# Patient Record
Sex: MALE | Race: WHITE
[De-identification: names, ages, dates, MRNs, and addresses within clinical notes are randomized per-mention and may not be internally consistent; named-entity substitution may affect disease eponyms.]

---

## 2017-02-23 ENCOUNTER — HOSPITAL ENCOUNTER (OUTPATIENT)
Dept: HOSPITAL 84 - D.CATH | Age: 77
Discharge: HOME | End: 2017-02-23
Attending: INTERNAL MEDICINE
Payer: MEDICARE

## 2017-02-23 VITALS — WEIGHT: 180.38 LBS | BODY MASS INDEX: 23.91 KG/M2 | BODY MASS INDEX: 23.91 KG/M2 | HEIGHT: 73 IN

## 2017-02-23 VITALS — DIASTOLIC BLOOD PRESSURE: 70 MMHG | SYSTOLIC BLOOD PRESSURE: 159 MMHG

## 2017-02-23 DIAGNOSIS — I48.92: Primary | ICD-10-CM

## 2017-02-23 LAB
ANION GAP SERPL CALC-SCNC: 11.3 MMOL/L (ref 8–16)
BASOPHILS NFR BLD AUTO: 0.6 % (ref 0–2)
BUN SERPL-MCNC: 19 MG/DL (ref 7–18)
CALCIUM SERPL-MCNC: 8.7 MG/DL (ref 8.5–10.1)
CHLORIDE SERPL-SCNC: 104 MMOL/L (ref 98–107)
CO2 SERPL-SCNC: 28.1 MMOL/L (ref 21–32)
CREAT SERPL-MCNC: 1.2 MG/DL (ref 0.6–1.3)
EOSINOPHIL NFR BLD: 1.9 % (ref 0–7)
ERYTHROCYTE [DISTWIDTH] IN BLOOD BY AUTOMATED COUNT: 14.1 % (ref 11.5–14.5)
GLUCOSE SERPL-MCNC: 98 MG/DL (ref 74–106)
HCT VFR BLD CALC: 41.5 % (ref 42–54)
HGB BLD-MCNC: 14 G/DL (ref 13.5–17.5)
IMM GRANULOCYTES NFR BLD: 0.3 % (ref 0–5)
INR PPP: 1.67 (ref 0.85–1.17)
LYMPHOCYTES NFR BLD AUTO: 32.9 % (ref 15–50)
MCH RBC QN AUTO: 28.2 PG (ref 26–34)
MCHC RBC AUTO-ENTMCNC: 33.7 G/DL (ref 31–37)
MCV RBC: 83.5 FL (ref 80–100)
MONOCYTES NFR BLD: 8.8 % (ref 2–11)
NEUTROPHILS NFR BLD AUTO: 55.5 % (ref 40–80)
OSMOLALITY SERPL CALC.SUM OF ELEC: 279 MOSM/KG (ref 275–300)
PLATELET # BLD: 209 10X3/UL (ref 130–400)
PMV BLD AUTO: 9.9 FL (ref 7.4–10.4)
POTASSIUM SERPL-SCNC: 4.4 MMOL/L (ref 3.5–5.1)
PROTHROMBIN TIME: 19.6 SECONDS (ref 11.6–15)
RBC # BLD AUTO: 4.97 10X6/UL (ref 4.2–6.1)
SODIUM SERPL-SCNC: 139 MMOL/L (ref 136–145)
WBC # BLD AUTO: 7.3 10X3/UL (ref 4.8–10.8)

## 2017-02-23 NOTE — NUR
RESTING IN BED, SPEAKING WITH FAMILY AT BEDSIDE. CARDIAC MONITOR
SHOWS SINUS ALEJANDRA AT RATE OF 51. 2L NC, NO RESP DISTRESS, NO C/O
PAIN. WILL CONTINUE TO MONITOR.

## 2017-02-23 NOTE — HEMODYNAMI
PATIENT:JOE SNOW                                    MEDICAL RECORD: [id number]
: 38                                            LOCATION:D.CAT          
ACCT# [id number]                                       ADMISSION DATE: 17
 
 
 Generatedon:20179:33
Patient name: JOE SNOW Patient #: [id number] Visit #: [id number] SSN: : 
1940
Date of study: 2017
Page: Of
Hemodynamic Procedure Report
****************************
Patient Data
Patient Demographics
Procedure consent was obtained
First Name: JOE          Gender: Male
Last Name: GWENDOLYN             : 1940
Middle Initial: J           Age: 77 year(s)
Patient #: [id number]       Race: Unknown
Visit #: [id number]
Accession #:
[id number]
Additional ID: [id number]
Contact details
Address: [place] [address]         Phone: [phone]
State: [place]
City: [place]
Zip code: [address]
Past Medical History
Allergies: No known allergies
Admission
Admission Data
Admission Date: 2017   Admission Time: 7:49
Procedure
Procedure Types
Cath Procedure
Diagnostic Procedure
Cardioversion
Procedure Description
Procedure Date
Procedure Date: 2017
Procedure Start Time: 9:05
Procedure Staff
Name                            Function
Dewey Quan MD                   Performing Physician
Abdirashid Velez RN                  Nurse
Angela Katz RT             Monitor
Сергей Frost RT                  Circulator
Virgilio Quintero MD                   Additional personnel
Procedure Data
Cath Procedure
Fluoroscopy
Diagnostic fluoroscopy      Total fluoroscopy Time: 0
time: 0 min                 min
Procedure Medications
Medication           Administration Route Dosage
Oxygen               NC                   2 l/min
Refer to Anesthesia
Notes for Sedation
 
Medications
Diprivan 1%          I.V.                 100 mg
(Propofol)
Hemodynamics
Rest
Heart Rate: 45 (bpm)
Snapshots
Pre Cath      Intra         NCS           Post Cath
Vital Signs
Time    Heart  Resp   SPO2 etCO2   PD2fcyb Respiration NIBP (mmHg) Rhythm  Pain 
   Sedation
Rate   (ipm)  (%)  (mmHg)  (mmHg)  (CO2) (ipm)                     Status  Level
(bpm)
9:21:39 44     20     97   17.8    0       19          Measuring   NSR     0 (11
)  10(A)
, No
pain
9:21:49 44     26     97   29.7    1.4     21          181/69(137) NSR     0 (11
)  8(A)
, No
pain
9:26:13 65     14     89   29      0                   118/70(95)  NSR     0 (11
)  9(A)
, No
pain
9:30:21 66     20     99   8.9     0       10          114/74(110) NSR     0 (11
)  10(A)
, No
pain
Medications
Time    Medication  Route  Dose  Verified Delivered Reason    Notes  Effectivene
ss
by       by
9:21:14 Diprivan 1% I.V.   100   Dewey     DR Quintero    for
(Propofol)         mg    Kadeem ALARCON           sedation
9:21:50 Oxygen      NC     2     Dewey Mendenhall    used for
l/min Kadeem Velez RN   procedure
9:21:54 Refer to                 Deweysrikanth Mendenhall
Anesthesia               Kadeem Velez RN
Notes for
Sedation
Medications
Procedure Log
Time     Note
9:08:04  Сергей Frost RT(R) sent for patient. Start room use.
9:08:05  Time tracking: Regular hours
9:08:14  Plan of Care:Hemodynamics will remain stable., Cardiac
rhythm will remain stable., Comfort level will be
maintained., Respiratory function will remain
adequate., Patient/ family verbilizes understanding of
procedure., Procedure tolerated without complication.,
Recovers from procedure without complications..
9:13:06  Patient received from Pre/Post Procedure Room to CCL 1
Alert and oriented. Tansferred to table in Supine
position.
9:13:07  Warm blankets applied, and cong hugger turned on for
patient comfort.
9:13:07  Correct patient and procedure confirmed by team.
9:13:09  Signed procedure consent form obtained from patient.
9:13:10  ECG and BP/O2 sat monitors applied to patient.
 
9:13:11  Full Disclosure recording started
9:13:58  H&P Date Dictated: 2017 Within 30 days and on
chart., H&P Addendum completed by physician on day of
procedure. (MUST COMPLETE FOR ALL OUTPATIENTS).
9:14:00  Pre-procedure instructions explained to patient.
9:14:01  Pre-op teaching completed and patient verbalized
understanding.
9:14:05  Family in waiting room.
9:14:44  Patient allergic to No known allergies
9:14:54  ----Pre-sedation anethsthesia assessment.----
9:15:09  See anestesia note for assessment
9:15:26  Patient pain scale 0/10 ?.
9:15:34  IV patent on arrival in left hand with 0.9% NaCl at
[facility].
9:15:39  Lab results completed and on chart.
9:16:22  Alarms reviewed by ASTER COREY
9:16:30  Quick combo pads placed on patients chest and back.
9:16:38  Quick Combo opened to sterile field.
9:18:57  Baseline sample Acquired.
9:19:24  Dr Quintero present and monitoring patient for TIVA.
9:19:49  Vital chart was started
9:20:02  Final Timeout: patient, procedure, and site verified
with staff and physician. All members of the team are
in agreement.
9:20:07  Physical assessment completed. ASA score P 2 - A
patient with mild systemic disease as per Dewey Quan MD.
9:20:13  Sedation plan: TIVA Propofol
9:21:14  Diprivan 1% (Propofol) 100 mg I.V. was given by DR Quintero; for sedation;
9:21:50  Oxygen 2 l/min NC was given by Abdirashid Velez RN; used
for procedure;
9:21:54  Refer to Anesthesia Notes for Sedation Medications was
given by Abdirashid Velez RN; ;
9:23:13  Defibrillator synced and charged to 100 Joules.
9:23:17  Shock delivered.
9:23:28  Patient cardioverted to 1st degree heart block.
9:24:50  Procedure ended.(Physican Out)
9:30:03  Fluoroscopy time 00.00 minutes.
9:30:23  Post procedure rhythm: sinus bradycardia
9:30:46  Post procedure instruction explained to
patient.Patient verbalizes understanding.
9:30:47  Patient needs reinforcement of post procedure
teaching.
9:30:54  Procedure and supply charges have been captured,
reviewed, submitted and are correct.
9:31:12  See physician's report for complete and final results.
9:31:16  Report given to Pre/Post Procedure Room.
9:33:00  Vital chart was stopped
9:33:07  Patient transfered to Pre/Post Procedure Room with
Stretcher.
9:33:18  End room use (Document Last)
Device Usage
Item  Manufacture  Quantity  Catalog       Hospital Part    Current Minimal Lot#
 /
Name                         Number        Charge   Number  Stock   Stock   Rashawn ruff#
Code
Global Locate 1         [id number]  [id number]   [id number]  [id number]  5
Combo
 
Signature Audit Belk
Stage           Time        Signature      Unsigned
Intra-Procedure 2017   Angela
9:33:29 AM  Counts RT(R)
Signatures
Monitor : Angela     Signature :
Counts RT               ______________________________
Date : ______________ Time :
______________
 
 
 
 
 
 
 
 
 
 
 
 
 
 
 
 
 
 
 
 
 
 
 
 
 
 
 
 
 
 
 
 
 
 
 
 
 
 
 
 
 
 
 
 
 
 
[address] [place] [address]

## 2017-03-21 NOTE — OP
PATIENT NAME:  JOE ALFARO                             MEDICAL RECORD: [id number]
:[birth year]                                             LOCATION:D.CAT          
                                                         ADMISSION DATE:        
SURGEON:  LES BARON M.D.          
 
 
DATE OF OPERATION:  2017
 
Cardioversion Note
 
REFERRING PHYSICIAN:  Torito Alfaro MD
 
PROCEDURE PERFORMED:  Cardioversion.
 
INDICATION:  A 77-year-old gentleman, presents with persistent atrial flutter.
 
DESCRIPTION OF PROCEDURE:  The patient was brought back to the cath lab.  It was
confirmed he remained in atrial flutter with variable block.  He received
anesthesia in the form of propofol.  Once the patient was adequately sedated, he
received 1 discharge of 100 joules.  This resulted in restoration of sinus
rhythm.  He tolerated the procedure well without any apparent complication.
 
IMPRESSION:  Successful cardioversion with restoration of sinus rhythm.
 
TRANSINT:[id number] Voice Confirmation ID: [id number] DOCUMENT ID: [id number]
                                           
                                           LES BARON M.D.          
 
 
 
Electronically Signed by LES BARON on 17 at 0817
 
 
 
 
 
 
 
 
 
 
 
 
 
 
 
 
 
 
 
CC:                                                             [id number]
DICTATION DATE: 17     :     17 1012      [facility] CLI 
                                                                      17
[id number] [place] [address]

## 2019-04-15 NOTE — NUR
Jose Stahl is a 63 year old male presenting for   Chief Complaint   Patient presents with   • Cough     Along with head and chest congestion, aches. x 2 days. Used Cough syrup     Denies Latex allergy or sensitivity.    Medication verified and med list updated  Refills needed today: No    Health Maintenance Due   Topic Date Due   • Depression Screening  11/25/1966   • Pneumococcal 19-64 Medium Risk (1 of 1 - PPSV23) 11/25/1973   • Shingles Vaccine (1 of 2) 11/25/2004   • Hepatitis C Screening  11/25/2005   • Influenza Vaccine (1) 09/01/2018       Patient is due for the topics as listed above                      RESTING IN BED, FAMILY AT BEDSIDE. VSS, NO RESP DISTRESS NOTED. NO
C/O NAUSEA OR CHEST PAIN. WILL CONTINUE TO MONITOR.

## 2020-04-01 ENCOUNTER — HOSPITAL ENCOUNTER (OUTPATIENT)
Dept: HOSPITAL 84 - D.CATH | Age: 80
Setting detail: OBSERVATION
LOS: 1 days | Discharge: HOME | End: 2020-04-02
Attending: INTERNAL MEDICINE | Admitting: INTERNAL MEDICINE
Payer: MEDICARE

## 2020-04-01 VITALS — DIASTOLIC BLOOD PRESSURE: 63 MMHG | SYSTOLIC BLOOD PRESSURE: 134 MMHG

## 2020-04-01 VITALS — SYSTOLIC BLOOD PRESSURE: 164 MMHG | DIASTOLIC BLOOD PRESSURE: 62 MMHG

## 2020-04-01 VITALS — DIASTOLIC BLOOD PRESSURE: 47 MMHG | SYSTOLIC BLOOD PRESSURE: 120 MMHG

## 2020-04-01 VITALS — SYSTOLIC BLOOD PRESSURE: 157 MMHG | DIASTOLIC BLOOD PRESSURE: 52 MMHG

## 2020-04-01 VITALS — BODY MASS INDEX: 24.15 KG/M2 | WEIGHT: 182.18 LBS | BODY MASS INDEX: 24.15 KG/M2 | HEIGHT: 73 IN

## 2020-04-01 VITALS — DIASTOLIC BLOOD PRESSURE: 59 MMHG | SYSTOLIC BLOOD PRESSURE: 120 MMHG

## 2020-04-01 VITALS — DIASTOLIC BLOOD PRESSURE: 44 MMHG | SYSTOLIC BLOOD PRESSURE: 124 MMHG

## 2020-04-01 VITALS — DIASTOLIC BLOOD PRESSURE: 58 MMHG | SYSTOLIC BLOOD PRESSURE: 122 MMHG

## 2020-04-01 VITALS — DIASTOLIC BLOOD PRESSURE: 56 MMHG | SYSTOLIC BLOOD PRESSURE: 130 MMHG

## 2020-04-01 VITALS — SYSTOLIC BLOOD PRESSURE: 128 MMHG | DIASTOLIC BLOOD PRESSURE: 64 MMHG

## 2020-04-01 VITALS — DIASTOLIC BLOOD PRESSURE: 48 MMHG | SYSTOLIC BLOOD PRESSURE: 111 MMHG

## 2020-04-01 VITALS — DIASTOLIC BLOOD PRESSURE: 63 MMHG | SYSTOLIC BLOOD PRESSURE: 128 MMHG

## 2020-04-01 DIAGNOSIS — I49.5: Primary | ICD-10-CM

## 2020-04-01 LAB
ANION GAP SERPL CALC-SCNC: 10.6 MMOL/L (ref 8–16)
ANION GAP SERPL CALC-SCNC: 12.8 MMOL/L (ref 8–16)
APTT BLD: 37.1 SECONDS (ref 22.8–39.4)
APTT BLD: 38.3 SECONDS (ref 22.8–39.4)
BUN SERPL-MCNC: 24 MG/DL (ref 7–18)
BUN SERPL-MCNC: 27 MG/DL (ref 7–18)
CALCIUM SERPL-MCNC: 8.1 MG/DL (ref 8.5–10.1)
CALCIUM SERPL-MCNC: 8.7 MG/DL (ref 8.5–10.1)
CHLORIDE SERPL-SCNC: 102 MMOL/L (ref 98–107)
CHLORIDE SERPL-SCNC: 104 MMOL/L (ref 98–107)
CO2 SERPL-SCNC: 25.5 MMOL/L (ref 21–32)
CO2 SERPL-SCNC: 26.9 MMOL/L (ref 21–32)
CREAT SERPL-MCNC: 1.1 MG/DL (ref 0.6–1.3)
CREAT SERPL-MCNC: 1.2 MG/DL (ref 0.6–1.3)
ERYTHROCYTE [DISTWIDTH] IN BLOOD BY AUTOMATED COUNT: 14.3 % (ref 11.5–14.5)
GLUCOSE SERPL-MCNC: 116 MG/DL (ref 74–106)
GLUCOSE SERPL-MCNC: 98 MG/DL (ref 74–106)
HCT VFR BLD CALC: 38.1 % (ref 42–54)
HGB BLD-MCNC: 12.5 G/DL (ref 13.5–17.5)
INR PPP: 1.06 (ref 0.85–1.17)
INR PPP: 1.17 (ref 0.85–1.17)
MCH RBC QN AUTO: 27.8 PG (ref 26–34)
MCHC RBC AUTO-ENTMCNC: 32.8 G/DL (ref 31–37)
MCV RBC: 84.9 FL (ref 80–100)
OSMOLALITY SERPL CALC.SUM OF ELEC: 276 MOSM/KG (ref 275–300)
OSMOLALITY SERPL CALC.SUM OF ELEC: 278 MOSM/KG (ref 275–300)
PLATELET # BLD: 242 10X3/UL (ref 130–400)
PMV BLD AUTO: 9.9 FL (ref 7.4–10.4)
POTASSIUM SERPL-SCNC: 4.3 MMOL/L (ref 3.5–5.1)
POTASSIUM SERPL-SCNC: 4.5 MMOL/L (ref 3.5–5.1)
PROTHROMBIN TIME: 13.8 SECONDS (ref 11.6–15)
PROTHROMBIN TIME: 14.9 SECONDS (ref 11.6–15)
RBC # BLD AUTO: 4.49 10X6/UL (ref 4.2–6.1)
SODIUM SERPL-SCNC: 136 MMOL/L (ref 136–145)
SODIUM SERPL-SCNC: 137 MMOL/L (ref 136–145)
WBC # BLD AUTO: 6.7 10X3/UL (ref 4.8–10.8)

## 2020-04-01 NOTE — HEMODYNAMI
PATIENT:JOE SNOW                                    MEDICAL RECORD: [id number]
: 38                                            LOCATION:D.CAT          
ACCT# [id number]                                       ADMISSION DATE: 20
 
 
 Generatedon:202013:17
Patient name: JOE SNOW Patient #: [id number] Visit #: [id number] SSN: [id number]
[id number] :
1940 Date of study: 2020
Page: Of
Hemodynamic Procedure Report
****************************
Patient Data
Patient Demographics
Procedure consent was obtained
First Name: JOE          Gender: Male
Last Name: GWENDOLYN             : 1940
Middle Initial: J           Age: 80 year(s)
Patient #: [id number]       Race: 
Visit #: [id number]
SSN: [id number]
Accession #:
[id number]
Additional ID: [id number]
Contact details
Address: [place] [address]         Phone: [phone]
State: [place]
City: [place]
Zip code: [address]
Past Medical History
Allergies: No known allergies
Admission
Admission Data
Admission Date: 2020    Admission Time: 10:49
Arrival Date: 2020      Arrival Time: 0:00
Lab Results
Lab Result Date: 2020   Lab Result Time: 0:00
CBC
Name         Units    Result                Min      Max
Hematocrit   %        38.1     *-(----)--   42       54
Hemoglobin   g/dl     12.5     *-(----)--   13.5     17.5
Procedure
Procedure Types
Cath Procedure
Diagnostic Procedure
PPM/ICD
PPM Dual Implant
Sedation Charges
Moderate Sedation up to 30 minutes
Procedure Description
Procedure Date
Procedure Date: 2020
Procedure Start Time: 12:39
Procedure End Time: 13:14
Procedure Staff
Name                            Function
Dillan Recinos MD              Performing Physician
Alejandro Torres MD             Assisting physician
Reina Hubbard RT              Monitor
 
William Xie RN              Nurse
Shannan Hitchcock RT                Scrub
Indication
Sick Sinus Syndrome
Procedure Data
Cath Procedure
Fluoroscopy
Diagnostic fluoroscopy      Total fluoroscopy Time: 3.4
time: 3.4 min               min
Diagnostic fluoroscopy      Total fluoroscopy dose:
dose: 140.08 mGy            140.08 mGy
Estimated blood loss: 10 ml
Procedure Complications
No complications
Procedure Medications
Medication           Administration Route Dosage
0.9% NaCl            I.V.                 100 ml/hr
Oxygen               etCO2 Nasal cannula  4 l/min
Lidocaine 1%         added to field       20
Ancef (1Gm/50ml NS)  I.V.P.B              1 g
Ancef Irrigation                          1 g
(1gm/500ml NS)
Versed               I.V.                 2 mg
Fentanyl             I.V.                 100 mcg
Versed               I.V.                 1 mg
Hemodynamics
Rest
HGB: 12.5 (g/dl) Heart Rate: 44 (bpm)
Snapshots
Pre Cath      Intra         NCS           Post Cath
Vital Signs
Time     Heart  Resp   SPO2 etCO2   NIBP (mmHg) Rhythm  Pain    Sedation
Rate   (ipm)  (%)  (mmHg)                      Status  Level
(bpm)
12:28:19 46     12     99   27      151/64(111) SB      0 (11)  10(A)
, No
pain
12:32:41 44     10     99   9       131/59(90)  SB      0 (11)  10(A)
, No
pain
12:36:59 42     21     98   23.2    102/51(83)  SB      0 (11)  10(A)
, No
pain
12:41:05 177    15     96   27.7    108/52(84)  SB      0 (11)  10(A)
, No
pain
12:45:13 43     15     91   26.2    110/55(75)  SB      0 (11)  9(A)
, No
pain
12:49:21 82     15     95   21.7    79/52(74)   SB      0 (11)  9(A)
, No
pain
12:54:16 42     16     96   18.7    115/58(88)  SB      0 (11)  9(A)
, No
pain
12:58:19 95     16     96   24      121/73(91)  Paced   0 (11)  10(A)
, No
pain
13:02:27 87     16     96   19.5    120/72(86)  Paced   0 (11)  10(A)
, No
 
pain
13:06:33 84     18     96   9       131/74(99)  Paced   0 (11)  10(A)
, No
pain
13:10:43 63     23     92   13.4    134/74(112) Paced   0 (11)  10(A)
, No
pain
Medications
Time     Medication  Route   Dose  Verified Delivered Reason     Notes  Effectiv
eness
by       by
12:26:09 0.9% NaCl   I.V.    100   William  William   Per
ml/hr Rojas Xie   physician
RN       RN
12:26:23 Oxygen      etCO2   4     William  William   for low 02
Nasal   l/min Lorigan  Lorigan   sats
cannula       RN       RN
12:26:55 Lidocaine   added   20ml  William  William   for local
1%          to      vial  Lorigan  Lorigan   anesthetic
field   ( x 2 RN       RN
)
12:28:49 Ancef       I.V.P.B 1 g   William  William   Per
(1Gm/50ml                 Lorigan  Lorigan   physician
NS)                       RN       RN
12:29:50 Ancef       Topical 1 g   William  William   used for
Irrigation  ( added       Lorigan  Lorigan   procedure
(1gm/500ml  to            RN       RN
NS)         field )
12:37:08 Versed      I.V.    2 mg  William  William   for
Lorigan  Lorigan   sedation
RN       RN
12:37:22 Fentanyl    I.V.    100   William  William   for
mcg   Lorigan  Lorigan   sedation
RN       RN
12:39:20 Versed      I.V.    1 mg  William  William   for
Lorigan  Lorigan   sedation
RN       RN
Procedure Log
Time     Note
12:03:41 Informed consent obtained and on chart
12:04:25 Indication : Sick Sinus Syndrome
12:04:33 Arrival Date: 2020 12:00:00 AM
12:04:58 Procedure Status PPM/ Gen Change/ Lead Revision/ Temp.
12:05:02 William Xie RN sent for patient. Start room use.
12:05:08 Time tracking: Regular hours (M-F 7:00 - 5:00)
12:05:15 Plan of Care:Hemodynamics will remain stable., Cardiac rhythm will
remain stable., Comfort level will be maintained., Respiratory function
will remain adequate., Patient/ family verbilizes understanding of
procedure., Procedure tolerated without complication., Recovers from
procedure without complications..
12:05:40 Med[employer] representative PARAMJIT JOHN present for procedure.
12:20:07 Patient received from Pre/Post Procedure Room to CCL 3 Alert and
oriented. Tansferred to table in Supine position.
12:25:21 Warm blankets applied, and cong hugger turned on for patient comfort.
12:25:22 Correct patient and procedure confirmed by team.
12:26:09 0.9% NaCl 100 ml/hr I.V. was administered by William Xie RN; Per
physician; Verbal order read back and verified.
12:26:23 Oxygen 4 l/min etCO2 Nasal cannula was administered by William Xie
RN; for low 02 sats; Verbal order read back and verified.
12:26:55 Lidocaine 1% 20ml vial ( x 2 ) added to field was administered by
 
William Xie RN; for local anesthetic; Verbal order read back and
verified.
12:27:02 Vital chart was started
12:28:49 Ancef (1Gm/50ml NS) 1 g I.V.P.B was administered by William Xie RN;
Per physician; Verbal order read back and verified.
12:29:50 Ancef Irrigation (1gm/500ml NS) 1 g Topical ( added to field ) was
administered by William Xie RN; used for procedure; Verbal order
read back and verified.
12:32:09 ECG and BP/O2 sat monitors applied to patient.
12:32:10 Baseline sample Acquired.
12:32:26 Rhythm: sinus bradycardia
12:32:29 Full Disclosure recording started
12:32:38 H&P Date Dictated: 2020 H&P Addendum completed by physician on day
of procedure. (MUST COMPLETE FOR ALL OUTPATIENTS), New H&P dictated by
physician..
12:32:39 Pre-procedure instructions explained to patient.
12:32:40 Pre-op teaching completed and patient verbalized understanding.
12:32:49 Family unavailable.
12:32:52 Patient NPO since Midnight.
12:33:08 Patient allergic to No known allergies
12:33:13 Is the patient allergic to Iodine/contrast media? No.
12:33:17 Was the patient premedicated? Yes
12:33:21 Is patient on blood thinner?No
12:33:28 **ACC** The patient was administered the following blood thiners within
the last 24 hours: None
12:34:08 Patient diabetic? No.
12:34:14 -----------------------------------------------------------------------
-
12:34:15 ----Pre-sedation anethsthesia assessment.----
12:34:20 Previous problem with sedation/anesthesia? No ?
12:34:22 Snore? Yes
12:34:26 Sleep apnea? No
12:34:28 Deviated septum? No
12:34:30 Opens mouth fully? Yes
12:34:33 Sticks out tongue? Yes
12:34:39 Airway obstruction? No ?
12:34:46 Dentures? Yes in tight
12:35:07 IV patent on arrival in left forearm with 0.9% NaCl at [facility].
12:35:48 Lab Result : Hemoglobin 12.5 g/dl
12:35:48 Lab Result : Hematocrit 38.1 %
12:36:02 Left chest area was prepped with chlora-prep and draped in sterile
fashion
12:36:04 Alarms reviewed by R. N.
12:36:04 Sharps counted by scrub and verified by R.N.
12:36:06 Physician arrived
12:36:07 --------ALL STOP TIME OUT------
12:36:08 Final Timeout: patient, procedure, and site verified with staff and
physician. All members of the team are in agreement.
12:36:17 Left chest site verified by team.
12:36:28 Fire Safety Assessment: A--An alcohol-based skin anteseptic being used
preoperatively., B--The operative or invasive procedure is being
performed above the xiphoid process or in the oropharynx., D--An ESU,
laser, or fiber-optic light is being used., E--There are other possible
contributors.
12:36:35 Physical assessment completed. ASA score P 2 - A patient with mild
systemic disease as per Dillan Recinos MD.
12:36:44 Sedation plan: IV Moderate Sedation Medication:Versed, Fentanyl
12:36:53 Use device set SINDI PPM
12:36:55 2-0 Ticron Multipack ([id number]) opened to sterile field.
12:36:56 3-0 Vicryl Single Pack [id number] opened to sterile field.
 
12:36:56 5-0 Monocryl PS2 Y495G opened to sterile field.
12:36:57 Cautery Tip  opened to sterile field.
12:36:58 Cautery Pushbutton Pencil opened to sterile field.
12:36:59 Mepilex Dressing (065804) opened to sterile field.
12:37:08 Versed 2 mg I.V. was administered by William Xie RN; for sedation;
Verbal order read back and verified.
12:37:22 Fentanyl 100 mcg I.V. was administered by William Xie RN; for
sedation; Verbal order read back and verified.
12:38:54 Procedure started.
12:39:16 Pre sharps counted by scrub and verified by RN: Sutures: 7; Sponges: 5;
Stick needles: 2; Skin needles: 2; Blade: 1; Cautery: 1
12:39:20 Versed 1 mg I.V. was administered by William Xie RN; for sedation;
Verbal order read back and verified.
12:39:22 Grounding pad site Left thigh.
12:39:24 Grounding pad site free from injury.
12:39:29 Lidocaine 1% was administered to left subclavicular area by Alejandro Torres MD .
12:41:01 Incision made to left subclavicular area.
12:41:11 Generator pocket made/opened.
12:43:08 Medtronic NIVIA XT DR Generator [id number] opened to sterile field.
12:43:34 Left subclavian vein accessed with 7Fr Peel Away Sheath.
12:43:38 Left subclavian vein accessed with 7Fr Peel Away Sheath.
12:44:31 Medtronic 4074-58 PPM Lead opened to sterile field.
12:44:33 Medtronic 4574-53 PPM Lead opened to sterile field.
12:45:14 Ventricular lead inserted and advanced.
12:45:18 Atrial lead inserted and advanced.
12:52:05 Ventricular lead positioned.
12:52:12 Ventricular lead tested.
12:53:25 Atrial lead positioned.
12:53:28 Atrial lead tested.
12:53:40 Peel-a-way sheath was split and removed.
12:53:42 Peel-a-way sheath was split and removed.
12:55:25 PPM Dual was attached to lead(s) and inserted into pocket.
12:56:09 PPM Dual was inserted subcutaneously to left chest.
12:56:21 Device pocket was irrigated with Ancef.
12:56:39 Atrial lead attachment was completed with 2-0 ticron.
12:56:45 Ventricular lead attachment was completed with 2-0 ticron.
12:57:10 Generator was sutured in place with 2-0 ticron.
13:00:27 Subcutaneous closure was completed with 3-0 vicryl plus.
13:01:54 Parameters-- Generator: Mode: DDDR. Lower Rate: 60bpm. Upper Rate:
130bpm.
13:03:32 Skin closure was completed with 5-0 monocryl.
13:03:40 Lt Chest incision was dressed with 4 x 4 and Tegaderm.
13:03:47 Procedure ended.(Physican Out)
13:05:07 Parameters--Atrial P/R Wave: 3.1mV. Current: 0mA; Threshold: ?0.8V;
Impedence: 532OHMS.
13:07:09 Parameters--Ventricular P/R Wave: 8.5mV. Current: 0mA; Threshold: 0.75V
;
Impedence: 1026OHMS.
13:07:41 Fluoroscopy time 03.40 minutes.
13:07:48 Fluoroscopy dose: 140.08 mGy
13:07:48 Flurop Dose total: 140.08
13:07:59 Dose Area Product 1489.75 mGy/cm.
13:09:13 Sharps counted by scrub and verified by R.N.
13:09:43 Insertion/operative site no bleeding no hematoma.
13:09:56 Post Chest area:stable
13:10:05 Post-procedure physical assessment completed. ASA score P 2 - A patient
with mild systemic disease as per Dillan Recinos MD.
13:10:09 Post procedure rhythm: paced
13:10:13 Estimated blood loss: 10 ml
 
13:10:15 Post procedure instruction explained to patient.Patient verbalizes
understanding.
13:10:16 Patient needs reinforcement of post procedure teaching.
13:11:28 Post sharps counted by scrub and verified by RN: Sutures: 7; Sponges: 5
;
Stick needles: 2; Skin needles: 2; Blade: 1; Cautery: 1
13:11:57 Procedure type changed to Cath procedure, Diagnostic procedure, PPM/ICD
,
PPM Dual Implant, Sedation Charges, Moderate Sedation up to 30 minutes
13:12:00 Procedure and supply charges have been captured, reviewed, submitted an
d
are correct.
13:12:45 Procedure Complication : No complications
13:12:49 Vital chart was stopped
13:12:53 Operative report dictated upon procedure completion.
13:12:54 See physician's report for complete and final results.
13:12:58 Report given to CVICU.
13:13:04 Patient transfered to CVICU with Bed.
13:14:41 Procedure ended.
13:14:41 Full Disclosure recording stopped
13:14:44 End room use (Document Last)
Device Usage
Item Name    Manufacture  Quantity  Catalog     Hospital Part     Current Minima
l Lot# /
Number      Charge   Number   Stock   Stock   Serial#
Code
2-0 Ticron   Ethicon      1         [id number]  544296   45709    503839  5
Multipack
([id number])
3-0 Vicryl   Ethicon      1         [id number]     294344   697827   465383  5
Single Pack
[id number]
5-0 Monocryl Ethicon      1         Y495G       887788   438836   177062  5
PS2 Y495G
Cautery Tip  Microtek     1         [id number]    251730   179076   595678  5
      [employer]
Cautery      [employer]     1         [id number]      470066   15605    537699  5
Pushbutton   [employer]
Pencil
Mepilex      Cardinal     1         871678      071697   853292   858828  5
Dressing     Health
(622587)
Medtronic    Medtronic    1         W1DR01      458710   3576386  985799  5     
  [id number]
NIVIA XT DR                                                                     
  2021
Generator
W1DR01
Medtronic    Medtronic    1         4074-58     886086   236822   601914  5     
  [id number]
4074-58 PPM                                                                     
  2020
Lead
Medtronic    Medtronic    1         4574-53     884652   934552   430606  5     
  [id number]
4574-53 PPM                                                                     
  2021
Lead
Signature Audit [place]
Stage           Time        Signature      Unsigned
 
Intra-Procedure 2020    Reina
1:15:04 PM  Stevie
RT(R) (CV)
Intra-Procedure 2020    William
1:16:28 PM  Rojas ADAMS
Intra-Procedure 2020    Dillan Hughes
1:17:13 PM  Cr ALARCON
 
 
 
 
 
 
 
 
 
 
 
 
 
 
 
 
 
 
 
 
 
 
 
 
 
 
 
 
 
 
 
 
 
 
 
 
 
 
 
 
 
 
 
 
 
 
 
 
[facility]                                          
[address] [place] [address]

## 2020-04-01 NOTE — OP
PATIENT NAME:  JOE SNOW                             MEDICAL RECORD: [id number]
:[birth year]                                             LOCATION:D.I    D.CV07
                                                         ADMISSION DATE:        
SURGEON:  ALEJANDRO DELONG MD          
 
 
DATE OF OPERATION:  2020
 
PREOPERATIVE DIAGNOSIS:  Sick sinus syndrome with freedom escape rhythm.
 
POSTOPERATIVE DIAGNOSIS:  Sick sinus syndrome with freedom escape rhythm.
 
PROCEDURE:
1.  Left subclavian vein dual lead pacemaker placement.
2.  Fluoroscopic interpretation.
 
SURGEON:  Alejandro Delong MD
 
CO-SURGEON:  Dillan Rosado MD
 
REPORT OF PROCEDURE:  The patient's left chest was prepped and draped in sterile
fashion.  A 20 mL of 1% lidocaine with epinephrine was infused into the
surrounding tissues.  A transverse incision was made on the left superior
lateral chest and a subcutaneous pouch was made over the pectoral fascia.  The
needles were used to cannulate the left subclavian vein and guidewires were
advanced until they were noted to be within the superior vena cava.  Fluoroscopy
was used to manipulate the wire until they were in good position.  At this
point, the dilator trocar devices were placed over the wires and the wires and
dilators were removed.  The leads were advanced through the trocars until they
were resting in the superior vena cava.  At this point, Dr. Rosado positioned
the leads appropriately in the atrium and ventricle.  Once the leads were noted
to be in good position, then these were sutured into place with 2-0 TiCron.  The
leads were affixed to the pacemaker, which was placed into the subcutaneous
pouch.  The pacemaker was sutured to the pectoral fascia using a single
interrupted 2-0 Ti-Cron.  The wound was then irrigated out with antibiotic
solution.  The subcutaneous tissues were reapproximated with interrupted 3-0
Vicryl and the skin was closed with running subcutaneous 5-0 Monocryl.
 
COMPLICATIONS:  None.
 
CONDITION:  Stable.
 
ANESTHESIA:  Local MAC.
 
BLOOD LOSS:  Minimal.
 
TRANSINT:[id number] Voice Confirmation ID: [id number] DOCUMENT ID: [id number]
                                           
                                           ALEJANDRO DELONG MD          
 
CC:                                                             [id number]
DICTATION DATE: 20 1304     :     20 1447      [facility]                                          
[address] [place] [address]

## 2020-04-02 VITALS — SYSTOLIC BLOOD PRESSURE: 117 MMHG | DIASTOLIC BLOOD PRESSURE: 53 MMHG

## 2020-04-02 VITALS — SYSTOLIC BLOOD PRESSURE: 147 MMHG | DIASTOLIC BLOOD PRESSURE: 81 MMHG

## 2020-04-02 VITALS — SYSTOLIC BLOOD PRESSURE: 152 MMHG | DIASTOLIC BLOOD PRESSURE: 65 MMHG

## 2020-04-02 VITALS — DIASTOLIC BLOOD PRESSURE: 59 MMHG | SYSTOLIC BLOOD PRESSURE: 139 MMHG

## 2020-04-02 VITALS — DIASTOLIC BLOOD PRESSURE: 61 MMHG | SYSTOLIC BLOOD PRESSURE: 155 MMHG

## 2020-04-02 VITALS — SYSTOLIC BLOOD PRESSURE: 112 MMHG | DIASTOLIC BLOOD PRESSURE: 53 MMHG

## 2020-04-02 VITALS — DIASTOLIC BLOOD PRESSURE: 56 MMHG | SYSTOLIC BLOOD PRESSURE: 106 MMHG

## 2020-04-02 VITALS — SYSTOLIC BLOOD PRESSURE: 161 MMHG | DIASTOLIC BLOOD PRESSURE: 69 MMHG

## 2020-04-02 VITALS — SYSTOLIC BLOOD PRESSURE: 121 MMHG | DIASTOLIC BLOOD PRESSURE: 61 MMHG

## 2020-04-02 VITALS — SYSTOLIC BLOOD PRESSURE: 157 MMHG | DIASTOLIC BLOOD PRESSURE: 66 MMHG

## 2020-04-02 NOTE — OP
PATIENT NAME:  WILL SNOW                             MEDICAL RECORD: [id number]
:[birth year]                                             [facility]
                                                         ADMISSION DATE:20
SURGEON:  CHAVA YUSUF MD          
 
 
DATE OF OPERATION:  2020
 
PROCEDURE:  Lead portion of permanent pacemaker placement.
 
SURGEON:  Alejandro Torres MD
 
INDICATION:  Sick sinus syndrome with freedom escape rhythm, syncope.
 
DESCRIPTION OF PROCEDURE:  After the left subclavian was cannulated via modified
Seldinger technique via Dr. Torres first under fluoroscopic guidance, the RV
lead was placed in the RV apex without difficulty.  After adequate R waves and
thresholds were obtained, the right atrial lead was placed in the right atrial
appendage without difficulty.  After adequate P waves and thresholds were
obtained, the leads were then attached to appropriate poles on the generator and
the pocket was closed via Dr. Torres.
 
IMPRESSION:  Successful lead portion of permanent pacemaker placement on Will Snow.
 
ESTIMATED BLOOD LOSS:  Minimal.
 
COMPLICATIONS:  None.
 
DISPOSITION:  To the floor, stable.
 
TRANSINT:[id number] Voice Confirmation ID: [id number] DOCUMENT ID: [id number]
                                           
                                           CHAVA YUSUF MD          
 
 
 
Electronically Signed by CHAVA YUSUF on 20 at 0907
 
 
 
 
 
 
 
 
 
 
 
 
CC:                                                             [id number]
DICTATION DATE: 20 1339     :     20 1511      ADM IN  
                                                                              
[place] [id number] [place] [id number]

## 2020-08-03 ENCOUNTER — HOSPITAL ENCOUNTER (INPATIENT)
Dept: HOSPITAL 84 - D.M2 | Age: 80
LOS: 5 days | Discharge: HOME | DRG: 177 | End: 2020-08-08
Attending: FAMILY MEDICINE | Admitting: FAMILY MEDICINE
Payer: MEDICARE

## 2020-08-03 VITALS
BODY MASS INDEX: 24.25 KG/M2 | HEIGHT: 73 IN | BODY MASS INDEX: 24.25 KG/M2 | HEIGHT: 73 IN | WEIGHT: 182.95 LBS | WEIGHT: 182.95 LBS

## 2020-08-03 VITALS — SYSTOLIC BLOOD PRESSURE: 156 MMHG | DIASTOLIC BLOOD PRESSURE: 71 MMHG

## 2020-08-03 VITALS — DIASTOLIC BLOOD PRESSURE: 62 MMHG | SYSTOLIC BLOOD PRESSURE: 129 MMHG

## 2020-08-03 DIAGNOSIS — E87.1: ICD-10-CM

## 2020-08-03 DIAGNOSIS — U07.1: Primary | ICD-10-CM

## 2020-08-03 DIAGNOSIS — R06.03: ICD-10-CM

## 2020-08-03 DIAGNOSIS — J30.9: ICD-10-CM

## 2020-08-03 DIAGNOSIS — J12.89: ICD-10-CM

## 2020-08-03 DIAGNOSIS — N17.9: ICD-10-CM

## 2020-08-03 DIAGNOSIS — I10: ICD-10-CM

## 2020-08-03 DIAGNOSIS — Z87.891: ICD-10-CM

## 2020-08-03 DIAGNOSIS — I48.91: ICD-10-CM

## 2020-08-03 DIAGNOSIS — H91.93: ICD-10-CM

## 2020-08-03 LAB
ALBUMIN SERPL-MCNC: 3.4 G/DL (ref 3.4–5)
ALBUMIN SERPL-MCNC: 3.6 G/DL (ref 3.4–5)
ALP SERPL-CCNC: 67 U/L (ref 30–120)
ALP SERPL-CCNC: 72 U/L (ref 30–120)
ALT SERPL-CCNC: 34 U/L (ref 10–68)
ALT SERPL-CCNC: 34 U/L (ref 10–68)
ANION GAP SERPL CALC-SCNC: 11.8 MMOL/L (ref 8–16)
BASOPHILS NFR BLD AUTO: 0.2 % (ref 0–2)
BILIRUB DIRECT SERPL-MCNC: 0.13 MG/DL (ref 0–0.3)
BILIRUB INDIRECT SERPL-MCNC: 0.27 MG/DL (ref 0–1)
BILIRUB SERPL-MCNC: 0.4 MG/DL (ref 0.2–1.3)
BILIRUB SERPL-MCNC: 0.44 MG/DL (ref 0.2–1.3)
BILIRUB SERPL-MCNC: NEGATIVE MG/DL
BUN SERPL-MCNC: 18 MG/DL (ref 7–18)
CALCIUM SERPL-MCNC: 8.8 MG/DL (ref 8.5–10.1)
CHLORIDE SERPL-SCNC: 101 MMOL/L (ref 98–107)
CO2 SERPL-SCNC: 26.6 MMOL/L (ref 21–32)
CREAT SERPL-MCNC: 1.2 MG/DL (ref 0.6–1.3)
CRP SERPL-MCNC: 2.3 MG/DL (ref 0–0.9)
D DIMER PPP FEU-MCNC: 0.78 UG/MLFEU (ref 0.2–0.54)
EOSINOPHIL NFR BLD: 1 % (ref 0–7)
ERYTHROCYTE [DISTWIDTH] IN BLOOD BY AUTOMATED COUNT: 13.7 % (ref 11.5–14.5)
FERRITIN SERPL-MCNC: 301 NG/ML (ref 3–244)
GLOBULIN SER-MCNC: 3.9 G/L
GLOBULIN SER-MCNC: 4.3 G/L
GLUCOSE SERPL-MCNC: 114 MG/DL (ref 74–106)
GLUCOSE SERPL-MCNC: NEGATIVE MG/DL
HCT VFR BLD CALC: 37.8 % (ref 42–54)
HGB BLD-MCNC: 12.7 G/DL (ref 13.5–17.5)
IMM GRANULOCYTES NFR BLD: 0.4 % (ref 0–5)
INR PPP: 0.97 (ref 0.85–1.17)
KETONES UR STRIP-MCNC: NEGATIVE MG/DL
LDH1 SERPL-CCNC: 320 U/L (ref 85–227)
LYMPHOCYTES NFR BLD AUTO: 22.5 % (ref 15–50)
MCH RBC QN AUTO: 27.8 PG (ref 26–34)
MCHC RBC AUTO-ENTMCNC: 33.6 G/DL (ref 31–37)
MCV RBC: 82.7 FL (ref 80–100)
MONOCYTES NFR BLD: 7.4 % (ref 2–11)
NEUTROPHILS NFR BLD AUTO: 68.5 % (ref 40–80)
NITRITE UR-MCNC: NEGATIVE MG/ML
OSMOLALITY SERPL CALC.SUM OF ELEC: 272 MOSM/KG (ref 275–300)
PH UR STRIP: 6 [PH] (ref 5–6)
PLATELET # BLD: 199 10X3/UL (ref 130–400)
PMV BLD AUTO: 9.7 FL (ref 7.4–10.4)
POTASSIUM SERPL-SCNC: 4.4 MMOL/L (ref 3.5–5.1)
PROT SERPL-MCNC: 7.5 G/DL (ref 6.4–8.2)
PROT SERPL-MCNC: 7.7 G/DL (ref 6.4–8.2)
PROTHROMBIN TIME: 12.9 SECONDS (ref 11.6–15)
RBC # BLD AUTO: 4.57 10X6/UL (ref 4.2–6.1)
SODIUM SERPL-SCNC: 135 MMOL/L (ref 136–145)
UROBILINOGEN UR-MCNC: NORMAL MG/DL
WBC # BLD AUTO: 4.9 10X3/UL (ref 4.8–10.8)

## 2020-08-03 NOTE — NUR
RECEIVED REPORT, WILL ASSUME CARE OF PT, PT IS SLEEPING, NO DISTRESS NOTICED
AT THIS TIME, BED IS LOW, SRX2, CALL LIGHT IN REACH, WILL CONTINUE PLAN OF
CARE

## 2020-08-04 VITALS — DIASTOLIC BLOOD PRESSURE: 52 MMHG | SYSTOLIC BLOOD PRESSURE: 132 MMHG

## 2020-08-04 VITALS — DIASTOLIC BLOOD PRESSURE: 57 MMHG | SYSTOLIC BLOOD PRESSURE: 129 MMHG

## 2020-08-04 VITALS — DIASTOLIC BLOOD PRESSURE: 47 MMHG | SYSTOLIC BLOOD PRESSURE: 108 MMHG

## 2020-08-04 VITALS — SYSTOLIC BLOOD PRESSURE: 138 MMHG | DIASTOLIC BLOOD PRESSURE: 56 MMHG

## 2020-08-04 LAB
ALBUMIN SERPL-MCNC: 2.8 G/DL (ref 3.4–5)
ALP SERPL-CCNC: 56 U/L (ref 30–120)
ALT SERPL-CCNC: 31 U/L (ref 10–68)
ANION GAP SERPL CALC-SCNC: 13.7 MMOL/L (ref 8–16)
BASOPHILS NFR BLD AUTO: 0.2 % (ref 0–2)
BILIRUB SERPL-MCNC: 0.48 MG/DL (ref 0.2–1.3)
BUN SERPL-MCNC: 18 MG/DL (ref 7–18)
CALCIUM SERPL-MCNC: 8.1 MG/DL (ref 8.5–10.1)
CHLORIDE SERPL-SCNC: 100 MMOL/L (ref 98–107)
CO2 SERPL-SCNC: 23.2 MMOL/L (ref 21–32)
CREAT SERPL-MCNC: 1.4 MG/DL (ref 0.6–1.3)
EOSINOPHIL NFR BLD: 0 % (ref 0–7)
ERYTHROCYTE [DISTWIDTH] IN BLOOD BY AUTOMATED COUNT: 13.4 % (ref 11.5–14.5)
GLOBULIN SER-MCNC: 3.8 G/L
GLUCOSE SERPL-MCNC: 130 MG/DL (ref 74–106)
HCT VFR BLD CALC: 33.5 % (ref 42–54)
HGB BLD-MCNC: 11.3 G/DL (ref 13.5–17.5)
IMM GRANULOCYTES NFR BLD: 0.2 % (ref 0–5)
LYMPHOCYTES NFR BLD AUTO: 23.2 % (ref 15–50)
MCH RBC QN AUTO: 27.5 PG (ref 26–34)
MCHC RBC AUTO-ENTMCNC: 33.7 G/DL (ref 31–37)
MCV RBC: 81.5 FL (ref 80–100)
MONOCYTES NFR BLD: 8.6 % (ref 2–11)
NEUTROPHILS NFR BLD AUTO: 67.8 % (ref 40–80)
OSMOLALITY SERPL CALC.SUM OF ELEC: 269 MOSM/KG (ref 275–300)
PLATELET # BLD: 178 10X3/UL (ref 130–400)
PMV BLD AUTO: 10 FL (ref 7.4–10.4)
POTASSIUM SERPL-SCNC: 3.9 MMOL/L (ref 3.5–5.1)
PROT SERPL-MCNC: 6.6 G/DL (ref 6.4–8.2)
RBC # BLD AUTO: 4.11 10X6/UL (ref 4.2–6.1)
SODIUM SERPL-SCNC: 133 MMOL/L (ref 136–145)
WBC # BLD AUTO: 4.7 10X3/UL (ref 4.8–10.8)

## 2020-08-04 NOTE — NUR
PT SITTING UP IN BED WATCHING TV. NO DISTRESS NOTED. HE DENIES PAIN, SHORTNESS
OF BREATH OR NEEDS AT THIS TIME. HIS BED IS LOW AND CALL LIGHT IS WITHIN
REACH.

## 2020-08-04 NOTE — NUR
REPORT RECIEVED. PT SITTING SEMI FOWLERS IN BED. RR EVEN AND UNLABORED ON RA.
HE HAS A L FA PIV INFUSING D51/2NS @ 30. BED LOCKED AND IN LOWEST POSITION,
CALL LIGHT WITHIN REACH. WILL CTM

## 2020-08-05 VITALS — SYSTOLIC BLOOD PRESSURE: 128 MMHG | DIASTOLIC BLOOD PRESSURE: 89 MMHG

## 2020-08-05 VITALS — SYSTOLIC BLOOD PRESSURE: 150 MMHG | DIASTOLIC BLOOD PRESSURE: 67 MMHG

## 2020-08-05 VITALS — SYSTOLIC BLOOD PRESSURE: 115 MMHG | DIASTOLIC BLOOD PRESSURE: 51 MMHG

## 2020-08-05 VITALS — DIASTOLIC BLOOD PRESSURE: 43 MMHG | SYSTOLIC BLOOD PRESSURE: 135 MMHG

## 2020-08-05 LAB
ALBUMIN SERPL-MCNC: 3 G/DL (ref 3.4–5)
ALP SERPL-CCNC: 61 U/L (ref 30–120)
ALT SERPL-CCNC: 35 U/L (ref 10–68)
ANION GAP SERPL CALC-SCNC: 11.8 MMOL/L (ref 8–16)
BASOPHILS NFR BLD AUTO: 0.1 % (ref 0–2)
BILIRUB SERPL-MCNC: 0.24 MG/DL (ref 0.2–1.3)
BUN SERPL-MCNC: 24 MG/DL (ref 7–18)
CALCIUM SERPL-MCNC: 8.4 MG/DL (ref 8.5–10.1)
CHLORIDE SERPL-SCNC: 100 MMOL/L (ref 98–107)
CO2 SERPL-SCNC: 25.8 MMOL/L (ref 21–32)
CREAT SERPL-MCNC: 1.4 MG/DL (ref 0.6–1.3)
EOSINOPHIL NFR BLD: 0 % (ref 0–7)
ERYTHROCYTE [DISTWIDTH] IN BLOOD BY AUTOMATED COUNT: 13.9 % (ref 11.5–14.5)
GLOBULIN SER-MCNC: 4.5 G/L
GLUCOSE SERPL-MCNC: 127 MG/DL (ref 74–106)
HCT VFR BLD CALC: 34.8 % (ref 42–54)
HGB BLD-MCNC: 11.8 G/DL (ref 13.5–17.5)
IMM GRANULOCYTES NFR BLD: 0.4 % (ref 0–5)
LYMPHOCYTES NFR BLD AUTO: 8 % (ref 15–50)
MCH RBC QN AUTO: 27.8 PG (ref 26–34)
MCHC RBC AUTO-ENTMCNC: 33.9 G/DL (ref 31–37)
MCV RBC: 82.1 FL (ref 80–100)
MONOCYTES NFR BLD: 5.7 % (ref 2–11)
NEUTROPHILS NFR BLD AUTO: 85.8 % (ref 40–80)
OSMOLALITY SERPL CALC.SUM OF ELEC: 273 MOSM/KG (ref 275–300)
PLATELET # BLD: 236 10X3/UL (ref 130–400)
PMV BLD AUTO: 10.1 FL (ref 7.4–10.4)
POTASSIUM SERPL-SCNC: 3.6 MMOL/L (ref 3.5–5.1)
PROT SERPL-MCNC: 7.5 G/DL (ref 6.4–8.2)
RBC # BLD AUTO: 4.24 10X6/UL (ref 4.2–6.1)
SODIUM SERPL-SCNC: 134 MMOL/L (ref 136–145)
WBC # BLD AUTO: 12.4 10X3/UL (ref 4.8–10.8)

## 2020-08-05 NOTE — NUR
PT RESTING IN BED WITH EYES CLOSED. RR EVEN AND UNLABORED. DOXY INFUSING AT
THIS TIME. BED LOW CALL LIGHT WITHIN REACH. VITALS STABLE. WILLCONTINUE TO
MONITOR.

## 2020-08-05 NOTE — NUR
REPORT RECIEVED. PT SITTING UP IN BEDSIDE CHAIR. RR EVEN AND UNLABORED ON RA.
PT HAS A L FA PIV INFUSING NS @50. PT HAS NO NEEDS AT THIS TIME. BED LOCKED
AND IN LOWEST POSITION, CALL LIGHT WITHIN REACH. WILL CTM

## 2020-08-05 NOTE — MORECARE
CASE MANAGEMENT DISCHARGE SUMMARY
 
 
PATIENT: JOE SNOW                       UNIT: [id number]
ACCOUNT#: [id number]                       ADM DATE: 20
AGE: 80     : [birth year]  SEX: M            ROOM/BED: D.2140    
AUTHOR: EZEKIEL ROPER                             PHYSICIAN:                               
 
REFERRING PHYSICIAN: GOYO SNOW MD                
DATE OF SERVICE: 20
Discharge Plan
 
 
Patient Name: JOE SNOW
[facility]
Encounter #: [id number]
Medical Record #: [id number]
: 1940
Planned Disposition: Home or Self Care
Anticipated Discharge Date: 
 
Discharge Date: 
Expected LOS: 
Initial Reviewer: [email or web address]
Initial Review Date: 2020
Generated: 20  11:15 am 
 DCPIA - Discharge Planning Initial Assessment
 
Updated by [id number]: Cindy Daigle on 20  10:13 am
*  Is the patient Alert and Oriented?
Yes
*  How many steps to enter\exit or inside your home? 0/1 *  PCP [facility] *  Pharmacy [facility] * 
Preadmission Environment
Home with Family
*  ADLs
Independent
*  Equipment
None
*  List name and contact numbers for known caregivers / representatives who 
currently or will assist patient after discharge:
CHANI MEEHAN [phone]
*  Verbal permission to speak to the caregivers and representatives has been 
obtained from the patient.
Yes
*  Community resources currently utilized
None
*  Additional services required to return to the preadmission environment?
No
*  Can the patient safely return to the preadmission environment?
Yes
 
*  Has this patient been hospitalized within the prior 30 days at any 
hospital?
No
 
 
 
 
 
 
Patient Name: JOE SNOW
Encounter #: [id number]
Page [id number]
 
 
 
 
 
Electronically Signed by EZEKIEL ROPER on 20 at 1016
 
 
 
 
 
 
**All edits/amendments must be made on the electronic document**
 
DICTATION DATE: 20 1015     : KRISTYN  20 1015     
RPT#: [id number]                                DC DATE:        
                                               STATUS: ADM IN  
[facility]
 [place] [address]
***END OF REPORT***

## 2020-08-05 NOTE — NUR
PT A/O X4. RR EVEN AND UNLABORED 97% RA WITH STABLE VITALS AND NO COMPLAINTS.
PT VERY PLEASENT. PT EXPRESSED CONCERN AT THIS TIME IS IF HIS WIFE COULD HAVE
COVID. PT STATES WIFE TESTED NEGATIVE AND HE POSITIVE. OHERWISE PT COMPLAINS
OF NO PAIN OR ANY FURTHER NEEDS. WILL CONTINUE TO MONITOR.

## 2020-08-05 NOTE — MORECARE
CASE MANAGEMENT DISCHARGE SUMMARY
 
 
PATIENT: JOE SNOW                       UNIT: [id number]
ACCOUNT#: [id number]                       ADM DATE: 20
AGE: 80     : 38  SEX: M            ROOM/BED: D.2140    
AUTHOR: JACQUELIN,DOC                             PHYSICIAN:                               
 
REFERRING PHYSICIAN: GOYO SNOW MD                
DATE OF SERVICE: 20
Discharge Plan
 
 
Patient Name: JOE SNOW
Facility: [facility]:[place]
Encounter #: [id number]
Medical Record #: [id number]
: 1940
Planned Disposition: Home or Self Care
Anticipated Discharge Date: 
 
Discharge Date: 
Expected LOS: 
Initial Reviewer: [email or web address]
Initial Review Date: 2020
Generated: 20  11:22 am 
Comments
 
DCP- Discharge Planning
 
Updated by [email or web address]: Cindy Daigle on 20   9:16 am CT
Patient Name: JOE SNOW                                     
Admission Status: Elective   
Accout number: [id number]                              
Admission Date: 2020   
: 1940                                                        
Admission Diagnosis:   
Attending: GOYO SNOW                                                
Current LOS:  2   
  
Anticipated DC Date:    
Planned Disposition: Home or Self Care   
Primary Insurance: MEDICARE A & B   
  
  
Discharge Planning Comments: CM called patient room to complete initial dc 
planning assessment.  CM educated patient on the CM role and verbal consent 
given by patient to complete assessment.  CM verified patient's address, 
phone number, and emergency contact phone numbers.  Patient lives at  home 
with his wife Laquita ([phone]).  At discharge patient plans to return 
 
home and feels this is a safe discharge.  CM discussed availability of home 
health, rehab services, and medical equipment. Patient denied known discharge 
needs at this time. Declination verbalized for any additional needs at home. 
Transportation provider at discharge will be Laquita . CM will continue to 
follow and will assist as needed with dc plans/needs.    
  
: Cindy Daigle
 DCPIA - Discharge Planning Initial Assessment
 
Updated by [email or web address]: Cindy Daigle on 20  10:13 am
*  Is the patient Alert and Oriented?
Yes
*  How many steps to enter\exit or inside your home? 0/1 *  [facility] *  Pharmacy [facility] * 
Preadmission Environment
Home with Family
*  ADLs
Independent
*  Equipment
None
*  List name and contact numbers for known caregivers / representatives who 
currently or will assist patient after discharge:
LAQUITA MEEHAN [phone]
*  Verbal permission to speak to the caregivers and representatives has been 
obtained from the patient.
Yes
*  Community resources currently utilized
None
*  Additional services required to return to the preadmission environment?
No
*  Can the patient safely return to the preadmission environment?
Yes
*  Has this patient been hospitalized within the prior 30 days at any 
hospital?
No
 
 
 
 
 
 
 
Last DP export: 20   9:15 am
Patient Name: JOE SNOW
 
Encounter #: [id number]
Page [id number]
 
 
 
 
 
Electronically Signed by EZEKIEL ROPER on 20 at 1022
 
 
 
 
 
 
**All edits/amendments must be made on the electronic document**
 
DICTATION DATE: 20 1022     : KRISTYN  20 1022     
RPT#: [id number]                                DC DATE:        
                                               STATUS: ADM IN  
[facility]
 [place] [address]
***END OF REPORT***

## 2020-08-06 VITALS — DIASTOLIC BLOOD PRESSURE: 68 MMHG | SYSTOLIC BLOOD PRESSURE: 125 MMHG

## 2020-08-06 VITALS — SYSTOLIC BLOOD PRESSURE: 141 MMHG | DIASTOLIC BLOOD PRESSURE: 54 MMHG

## 2020-08-06 VITALS — DIASTOLIC BLOOD PRESSURE: 47 MMHG | SYSTOLIC BLOOD PRESSURE: 107 MMHG

## 2020-08-06 VITALS — DIASTOLIC BLOOD PRESSURE: 57 MMHG | SYSTOLIC BLOOD PRESSURE: 146 MMHG

## 2020-08-06 VITALS — SYSTOLIC BLOOD PRESSURE: 110 MMHG | DIASTOLIC BLOOD PRESSURE: 42 MMHG

## 2020-08-06 LAB
ALBUMIN SERPL-MCNC: 2.7 G/DL (ref 3.4–5)
ALP SERPL-CCNC: 54 U/L (ref 30–120)
ALT SERPL-CCNC: 32 U/L (ref 10–68)
ANION GAP SERPL CALC-SCNC: 15.5 MMOL/L (ref 8–16)
BASOPHILS NFR BLD AUTO: 0.1 % (ref 0–2)
BILIRUB SERPL-MCNC: 0.27 MG/DL (ref 0.2–1.3)
BUN SERPL-MCNC: 27 MG/DL (ref 7–18)
CALCIUM SERPL-MCNC: 7.9 MG/DL (ref 8.5–10.1)
CHLORIDE SERPL-SCNC: 102 MMOL/L (ref 98–107)
CO2 SERPL-SCNC: 20.6 MMOL/L (ref 21–32)
CREAT SERPL-MCNC: 1.3 MG/DL (ref 0.6–1.3)
EOSINOPHIL NFR BLD: 0 % (ref 0–7)
ERYTHROCYTE [DISTWIDTH] IN BLOOD BY AUTOMATED COUNT: 13.8 % (ref 11.5–14.5)
GLOBULIN SER-MCNC: 3.9 G/L
GLUCOSE SERPL-MCNC: 109 MG/DL (ref 74–106)
HCT VFR BLD CALC: 32.2 % (ref 42–54)
HGB BLD-MCNC: 10.9 G/DL (ref 13.5–17.5)
IMM GRANULOCYTES NFR BLD: 0.5 % (ref 0–5)
LYMPHOCYTES NFR BLD AUTO: 14.8 % (ref 15–50)
MCH RBC QN AUTO: 27.5 PG (ref 26–34)
MCHC RBC AUTO-ENTMCNC: 33.9 G/DL (ref 31–37)
MCV RBC: 81.1 FL (ref 80–100)
MONOCYTES NFR BLD: 5.3 % (ref 2–11)
NEUTROPHILS NFR BLD AUTO: 79.3 % (ref 40–80)
OSMOLALITY SERPL CALC.SUM OF ELEC: 273 MOSM/KG (ref 275–300)
PLATELET # BLD: 229 10X3/UL (ref 130–400)
PMV BLD AUTO: 10 FL (ref 7.4–10.4)
POTASSIUM SERPL-SCNC: 4.1 MMOL/L (ref 3.5–5.1)
PROT SERPL-MCNC: 6.6 G/DL (ref 6.4–8.2)
RBC # BLD AUTO: 3.97 10X6/UL (ref 4.2–6.1)
SODIUM SERPL-SCNC: 134 MMOL/L (ref 136–145)
WBC # BLD AUTO: 10.4 10X3/UL (ref 4.8–10.8)

## 2020-08-06 NOTE — NUR
REPORT RECIEVED. PT SITTING UP IN BEDSIDE CHAIR. RR EVEN AND UNLABORED ON RA.
PT HAS A L FA PIV INFUSING NS @ 49. PT HAS NO COMPLAINTS AT THIS TIME. WILL
CTM

## 2020-08-07 VITALS — SYSTOLIC BLOOD PRESSURE: 117 MMHG | DIASTOLIC BLOOD PRESSURE: 50 MMHG

## 2020-08-07 VITALS — SYSTOLIC BLOOD PRESSURE: 125 MMHG | DIASTOLIC BLOOD PRESSURE: 71 MMHG

## 2020-08-07 VITALS — SYSTOLIC BLOOD PRESSURE: 131 MMHG | DIASTOLIC BLOOD PRESSURE: 68 MMHG

## 2020-08-07 LAB
ALBUMIN SERPL-MCNC: 2.5 G/DL (ref 3.4–5)
ALP SERPL-CCNC: 54 U/L (ref 30–120)
ALT SERPL-CCNC: 40 U/L (ref 10–68)
ANION GAP SERPL CALC-SCNC: 13.9 MMOL/L (ref 8–16)
BASOPHILS NFR BLD AUTO: 0.1 % (ref 0–2)
BILIRUB SERPL-MCNC: 0.28 MG/DL (ref 0.2–1.3)
BUN SERPL-MCNC: 26 MG/DL (ref 7–18)
CALCIUM SERPL-MCNC: 7.7 MG/DL (ref 8.5–10.1)
CHLORIDE SERPL-SCNC: 105 MMOL/L (ref 98–107)
CO2 SERPL-SCNC: 22.1 MMOL/L (ref 21–32)
CREAT SERPL-MCNC: 1.2 MG/DL (ref 0.6–1.3)
EOSINOPHIL NFR BLD: 2.7 % (ref 0–7)
ERYTHROCYTE [DISTWIDTH] IN BLOOD BY AUTOMATED COUNT: 14 % (ref 11.5–14.5)
GLOBULIN SER-MCNC: 4 G/L
GLUCOSE SERPL-MCNC: 113 MG/DL (ref 74–106)
HCT VFR BLD CALC: 31.2 % (ref 42–54)
HGB BLD-MCNC: 10.5 G/DL (ref 13.5–17.5)
IMM GRANULOCYTES NFR BLD: 1.1 % (ref 0–5)
LYMPHOCYTES NFR BLD AUTO: 14 % (ref 15–50)
MCH RBC QN AUTO: 27.3 PG (ref 26–34)
MCHC RBC AUTO-ENTMCNC: 33.7 G/DL (ref 31–37)
MCV RBC: 81 FL (ref 80–100)
MONOCYTES NFR BLD: 8 % (ref 2–11)
NEUTROPHILS NFR BLD AUTO: 74.1 % (ref 40–80)
OSMOLALITY SERPL CALC.SUM OF ELEC: 279 MOSM/KG (ref 275–300)
PLATELET # BLD: 240 10X3/UL (ref 130–400)
PMV BLD AUTO: 9.8 FL (ref 7.4–10.4)
POTASSIUM SERPL-SCNC: 4 MMOL/L (ref 3.5–5.1)
PROT SERPL-MCNC: 6.5 G/DL (ref 6.4–8.2)
RBC # BLD AUTO: 3.85 10X6/UL (ref 4.2–6.1)
SODIUM SERPL-SCNC: 137 MMOL/L (ref 136–145)
WBC # BLD AUTO: 7.1 10X3/UL (ref 4.8–10.8)

## 2020-08-08 VITALS — SYSTOLIC BLOOD PRESSURE: 138 MMHG | DIASTOLIC BLOOD PRESSURE: 66 MMHG

## 2020-08-08 LAB
ALBUMIN SERPL-MCNC: 2.6 G/DL (ref 3.4–5)
ALP SERPL-CCNC: 55 U/L (ref 30–120)
ALT SERPL-CCNC: 46 U/L (ref 10–68)
ANION GAP SERPL CALC-SCNC: 14.1 MMOL/L (ref 8–16)
BASOPHILS NFR BLD AUTO: 0.2 % (ref 0–2)
BILIRUB SERPL-MCNC: 0.22 MG/DL (ref 0.2–1.3)
BUN SERPL-MCNC: 29 MG/DL (ref 7–18)
CALCIUM SERPL-MCNC: 8 MG/DL (ref 8.5–10.1)
CHLORIDE SERPL-SCNC: 104 MMOL/L (ref 98–107)
CO2 SERPL-SCNC: 21.9 MMOL/L (ref 21–32)
CREAT SERPL-MCNC: 1.2 MG/DL (ref 0.6–1.3)
EOSINOPHIL NFR BLD: 0 % (ref 0–7)
ERYTHROCYTE [DISTWIDTH] IN BLOOD BY AUTOMATED COUNT: 14.1 % (ref 11.5–14.5)
GLOBULIN SER-MCNC: 4.2 G/L
GLUCOSE SERPL-MCNC: 116 MG/DL (ref 74–106)
HCT VFR BLD CALC: 32.2 % (ref 42–54)
HGB BLD-MCNC: 10.8 G/DL (ref 13.5–17.5)
IMM GRANULOCYTES NFR BLD: 0.7 % (ref 0–5)
LYMPHOCYTES NFR BLD AUTO: 15.6 % (ref 15–50)
MCH RBC QN AUTO: 27.4 PG (ref 26–34)
MCHC RBC AUTO-ENTMCNC: 33.5 G/DL (ref 31–37)
MCV RBC: 81.7 FL (ref 80–100)
MONOCYTES NFR BLD: 7.6 % (ref 2–11)
NEUTROPHILS NFR BLD AUTO: 75.9 % (ref 40–80)
OSMOLALITY SERPL CALC.SUM OF ELEC: 278 MOSM/KG (ref 275–300)
PLATELET # BLD: 300 10X3/UL (ref 130–400)
PMV BLD AUTO: 9.7 FL (ref 7.4–10.4)
POTASSIUM SERPL-SCNC: 4 MMOL/L (ref 3.5–5.1)
PROT SERPL-MCNC: 6.8 G/DL (ref 6.4–8.2)
RBC # BLD AUTO: 3.94 10X6/UL (ref 4.2–6.1)
SODIUM SERPL-SCNC: 136 MMOL/L (ref 136–145)
WBC # BLD AUTO: 10.5 10X3/UL (ref 4.8–10.8)

## 2020-08-08 NOTE — MORECARE
CASE MANAGEMENT DISCHARGE SUMMARY
 
 
PATIENT: JOE SNOW                       UNIT: [id number]
ACCOUNT#: [id number]                       ADM DATE: 20
AGE: 80     : 38  SEX: M            ROOM/BED: D.2140    
AUTHOR: EZEKIEL ROPER                             PHYSICIAN:                               
 
REFERRING PHYSICIAN: GOYO SNOW MD                
DATE OF SERVICE: 20
Discharge Plan
 
 
Patient Name: JOE SNOW
Facility: [facility]:[place]
Encounter #: [id number]
Medical Record #: [id number]
: 1940
Planned Disposition: Home or Self Care
Anticipated Discharge Date: 
 
Discharge Date: 2020
Expected LOS: 
Initial Reviewer: [id number]
Initial Review Date: 2020
Generated: 20   1:55 pm 
DCP- Discharge Planning
 
Updated by [email or web address]: Cindy Daigle on 20   9:16 am CT
Patient Name: JOE SNOW                                     
Admission Status: Elective   
Accout number: [id number]                              
Admission Date: 2020   
: 1940                                                        
Admission Diagnosis:   
Attending: GOYO SNOW                                                
Current LOS:  2   
  
Anticipated DC Date:    
Planned Disposition: Home or Self Care   
Primary Insurance: MEDICARE A & B   
  
  
Discharge Planning Comments: CM called patient room to complete initial dc 
planning assessment.  CM educated patient on the CM role and verbal consent 
given by patient to complete assessment.  CM verified patient's address, 
phone number, and emergency contact phone numbers.  Patient lives at  home 
with his wife Laquita ([phone]).  At discharge patient plans to return 
home and feels this is a safe discharge.  CM discussed availability of home 
health, rehab services, and medical equipment. Patient denied known discharge 
 
needs at this time. Declination verbalized for any additional needs at home. 
Transportation provider at discharge will be Laquita . CM will continue to 
follow and will assist as needed with dc plans/needs.    
  
: Cindy Daigle
 DCPIA - Discharge Planning Initial Assessment
 
Updated by [email or web address]: Cindy Daigle on 20  10:13 am
*  Is the patient Alert and Oriented?
Yes
*  How many steps to enter\exit or inside your home? 0/1 *  [facility] *  Pharmacy [facility] * 
Preadmission Environment
Home with Family
*  ADLs
Independent
*  Equipment
None
*  List name and contact numbers for known caregivers / representatives who 
currently or will assist patient after discharge:
LAQUITA MEEHAN [phone]
*  Verbal permission to speak to the caregivers and representatives has been 
obtained from the patient.
Yes
*  Community resources currently utilized
None
*  Additional services required to return to the preadmission environment?
No
*  Can the patient safely return to the preadmission environment?
Yes
*  Has this patient been hospitalized within the prior 30 days at any 
hospital?
No
 
 
 
 
 
Coverage Notice
 
Reviewer: [id number] Myesha Larson
 
Notice Issued Date-Time: 2020   9:29
Notice Type: IM Discharge Notice
 
Notice Delivered To: Patient
Relationship to Patient: Self
Representative Name: 
 
Delivery Method: HAND - Hand Delivered
Hilda Days:
Prior Verbal Notification: Yes
 
 
Recipient Understood Notice: Yes
Recipient Signature: 
Med Rec Note Co-signed by Attending:
 
Coverage Notice Comment:  PATIENT IN ISOLATION VERBAL NOTIFICATION COPY SENT 
IN WITH NURSING FOR DISCHARGE
 
Last DP export: 20   9:22 am
Patient Name: JOE SNOW
Encounter #: [id number]
Page [id number]
 
 
 
 
 
Electronically Signed by EZEKIEL ROPER on 20 at 1256
 
 
 
 
 
 
**All edits/amendments must be made on the electronic document**
 
DICTATION DATE: 20 1255     : KRISTYN  20 1255     
RPT#: [id number]                                DC DATE:20
                                               STATUS: DIS IN  
[facility]
 [place] [address]
***END OF REPORT***

## 2020-08-08 NOTE — NUR
DC INFORMATION REVIEWED WITH PT. PT VERBALIZES UNDERSTANDING. IV AND TELY
REMOVED FROM PT.
PT PERSONAL BELONGINGS AT THE BEDSIDE, PT LEFT WITH AIDE TO
DC TO PERSONAL CAR. PT GIVEN N95 MASK AND SHIELD TO WEAR.